# Patient Record
Sex: MALE | Race: BLACK OR AFRICAN AMERICAN | NOT HISPANIC OR LATINO | Employment: OTHER | URBAN - METROPOLITAN AREA
[De-identification: names, ages, dates, MRNs, and addresses within clinical notes are randomized per-mention and may not be internally consistent; named-entity substitution may affect disease eponyms.]

---

## 2017-10-14 ENCOUNTER — HOSPITAL ENCOUNTER (EMERGENCY)
Facility: HOSPITAL | Age: 37
Discharge: HOME | End: 2017-10-14
Attending: EMERGENCY MEDICINE

## 2017-10-14 VITALS
HEIGHT: 70 IN | WEIGHT: 200 LBS | OXYGEN SATURATION: 95 % | RESPIRATION RATE: 16 BRPM | TEMPERATURE: 97 F | BODY MASS INDEX: 28.63 KG/M2 | DIASTOLIC BLOOD PRESSURE: 97 MMHG | SYSTOLIC BLOOD PRESSURE: 155 MMHG | HEART RATE: 51 BPM

## 2017-10-14 DIAGNOSIS — N20.0 NEPHROLITHIASIS: Primary | ICD-10-CM

## 2017-10-14 DIAGNOSIS — R10.9 LEFT FLANK PAIN: ICD-10-CM

## 2017-10-14 DIAGNOSIS — R11.0 NAUSEA: ICD-10-CM

## 2017-10-14 LAB
ALBUMIN SERPL BCP-MCNC: 4.1 G/DL
ALP SERPL-CCNC: 80 U/L
ALT SERPL W/O P-5'-P-CCNC: 33 U/L
ANION GAP SERPL CALC-SCNC: 10 MMOL/L
AST SERPL-CCNC: 23 U/L
BACTERIA #/AREA URNS HPF: ABNORMAL /HPF
BASOPHILS # BLD AUTO: 0.04 K/UL
BASOPHILS NFR BLD: 0.3 %
BILIRUB SERPL-MCNC: 1.1 MG/DL
BILIRUB UR QL STRIP: NEGATIVE
BUN SERPL-MCNC: 9 MG/DL
CALCIUM SERPL-MCNC: 9.5 MG/DL
CHLORIDE SERPL-SCNC: 105 MMOL/L
CLARITY UR: CLEAR
CO2 SERPL-SCNC: 25 MMOL/L
COLOR UR: YELLOW
CREAT SERPL-MCNC: 1.3 MG/DL
DIFFERENTIAL METHOD: ABNORMAL
EOSINOPHIL # BLD AUTO: 0.2 K/UL
EOSINOPHIL NFR BLD: 1.2 %
ERYTHROCYTE [DISTWIDTH] IN BLOOD BY AUTOMATED COUNT: 12.3 %
EST. GFR  (AFRICAN AMERICAN): >60 ML/MIN/1.73 M^2
EST. GFR  (NON AFRICAN AMERICAN): >60 ML/MIN/1.73 M^2
GLUCOSE SERPL-MCNC: 106 MG/DL
GLUCOSE UR QL STRIP: NEGATIVE
HCT VFR BLD AUTO: 41.3 %
HGB BLD-MCNC: 14.9 G/DL
HGB UR QL STRIP: ABNORMAL
HYALINE CASTS #/AREA URNS LPF: 0 /LPF
KETONES UR QL STRIP: NEGATIVE
LEUKOCYTE ESTERASE UR QL STRIP: NEGATIVE
LYMPHOCYTES # BLD AUTO: 3.6 K/UL
LYMPHOCYTES NFR BLD: 28.7 %
MCH RBC QN AUTO: 30.7 PG
MCHC RBC AUTO-ENTMCNC: 36.1 G/DL
MCV RBC AUTO: 85 FL
MICROSCOPIC COMMENT: ABNORMAL
MONOCYTES # BLD AUTO: 1.4 K/UL
MONOCYTES NFR BLD: 11.5 %
NEUTROPHILS # BLD AUTO: 7.3 K/UL
NEUTROPHILS NFR BLD: 58.3 %
NITRITE UR QL STRIP: NEGATIVE
PH UR STRIP: >8 [PH] (ref 5–8)
PLATELET # BLD AUTO: 314 K/UL
PMV BLD AUTO: 8.2 FL
POTASSIUM SERPL-SCNC: 3.2 MMOL/L
PROT SERPL-MCNC: 8 G/DL
PROT UR QL STRIP: ABNORMAL
RBC # BLD AUTO: 4.86 M/UL
RBC #/AREA URNS HPF: 45 /HPF (ref 0–4)
SODIUM SERPL-SCNC: 140 MMOL/L
SP GR UR STRIP: 1.02 (ref 1–1.03)
URN SPEC COLLECT METH UR: ABNORMAL
UROBILINOGEN UR STRIP-ACNC: ABNORMAL EU/DL
WBC # BLD AUTO: 12.48 K/UL
WBC #/AREA URNS HPF: 1 /HPF (ref 0–5)

## 2017-10-14 PROCEDURE — 99284 EMERGENCY DEPT VISIT MOD MDM: CPT | Mod: 25

## 2017-10-14 PROCEDURE — 25000003 PHARM REV CODE 250: Performed by: PHYSICIAN ASSISTANT

## 2017-10-14 PROCEDURE — 96361 HYDRATE IV INFUSION ADD-ON: CPT

## 2017-10-14 PROCEDURE — 80053 COMPREHEN METABOLIC PANEL: CPT

## 2017-10-14 PROCEDURE — 85025 COMPLETE CBC W/AUTO DIFF WBC: CPT

## 2017-10-14 PROCEDURE — 81000 URINALYSIS NONAUTO W/SCOPE: CPT

## 2017-10-14 PROCEDURE — 96375 TX/PRO/DX INJ NEW DRUG ADDON: CPT

## 2017-10-14 PROCEDURE — 63600175 PHARM REV CODE 636 W HCPCS: Performed by: PHYSICIAN ASSISTANT

## 2017-10-14 PROCEDURE — 96374 THER/PROPH/DIAG INJ IV PUSH: CPT

## 2017-10-14 PROCEDURE — 96376 TX/PRO/DX INJ SAME DRUG ADON: CPT

## 2017-10-14 RX ORDER — HYDROMORPHONE HYDROCHLORIDE 2 MG/ML
0.5 INJECTION, SOLUTION INTRAMUSCULAR; INTRAVENOUS; SUBCUTANEOUS
Status: COMPLETED | OUTPATIENT
Start: 2017-10-14 | End: 2017-10-14

## 2017-10-14 RX ORDER — TAMSULOSIN HYDROCHLORIDE 0.4 MG/1
0.4 CAPSULE ORAL DAILY
Qty: 14 CAPSULE | Refills: 0 | Status: SHIPPED | OUTPATIENT
Start: 2017-10-14 | End: 2017-10-28

## 2017-10-14 RX ORDER — ONDANSETRON 4 MG/1
4 TABLET, ORALLY DISINTEGRATING ORAL EVERY 6 HOURS PRN
Qty: 20 TABLET | Refills: 0 | Status: SHIPPED | OUTPATIENT
Start: 2017-10-14 | End: 2017-10-19

## 2017-10-14 RX ORDER — OXYCODONE AND ACETAMINOPHEN 10; 325 MG/1; MG/1
1 TABLET ORAL EVERY 6 HOURS PRN
Qty: 15 TABLET | Refills: 0 | Status: SHIPPED | OUTPATIENT
Start: 2017-10-14 | End: 2017-10-19

## 2017-10-14 RX ORDER — ONDANSETRON 2 MG/ML
4 INJECTION INTRAMUSCULAR; INTRAVENOUS
Status: COMPLETED | OUTPATIENT
Start: 2017-10-14 | End: 2017-10-14

## 2017-10-14 RX ORDER — MORPHINE SULFATE 10 MG/ML
4 INJECTION INTRAMUSCULAR; INTRAVENOUS; SUBCUTANEOUS
Status: COMPLETED | OUTPATIENT
Start: 2017-10-14 | End: 2017-10-14

## 2017-10-14 RX ORDER — SODIUM CHLORIDE 9 MG/ML
1000 INJECTION, SOLUTION INTRAVENOUS
Status: COMPLETED | OUTPATIENT
Start: 2017-10-14 | End: 2017-10-14

## 2017-10-14 RX ORDER — TAMSULOSIN HYDROCHLORIDE 0.4 MG/1
0.4 CAPSULE ORAL
Status: COMPLETED | OUTPATIENT
Start: 2017-10-14 | End: 2017-10-14

## 2017-10-14 RX ADMIN — SODIUM CHLORIDE 1000 ML: 0.9 INJECTION, SOLUTION INTRAVENOUS at 10:10

## 2017-10-14 RX ADMIN — TAMSULOSIN HYDROCHLORIDE 0.4 MG: 0.4 CAPSULE ORAL at 12:10

## 2017-10-14 RX ADMIN — MORPHINE SULFATE 4 MG: 10 INJECTION INTRAVENOUS at 10:10

## 2017-10-14 RX ADMIN — HYDROMORPHONE HYDROCHLORIDE 0.5 MG: 2 INJECTION, SOLUTION INTRAMUSCULAR; INTRAVENOUS; SUBCUTANEOUS at 11:10

## 2017-10-14 RX ADMIN — HYDROMORPHONE HYDROCHLORIDE 0.5 MG: 2 INJECTION, SOLUTION INTRAMUSCULAR; INTRAVENOUS; SUBCUTANEOUS at 12:10

## 2017-10-14 RX ADMIN — ONDANSETRON 4 MG: 2 INJECTION INTRAMUSCULAR; INTRAVENOUS at 10:10

## 2017-10-14 NOTE — ED PROVIDER NOTES
Encounter Date: 10/14/2017    SCRIBE #1 NOTE: I, Rhys Evans, am scribing for, and in the presence of,  Shy Cross PA-C. I have scribed the following portions of the note - Other sections scribed: HPI, ROS.       History     Chief Complaint   Patient presents with    Flank Pain     States he has left side flank pain that started 30 min ago.  No hx of kidney stones     CC: Flank Pain    37 y/o male with no PMHx presents to the ED c/o acute onset L sided flank pain that radiates to LLQ abdomen with associated decreased urine output that started 30 min PTA. The pain is severe (10/10) and constant; lying down exacerbates the pain. 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 The patient reports FMHx of kidney stones. The patient denies hx of abdominal surgeries or similar symptoms in the past. The patient denies dysuria, difficulty urinating, chest pain, difficulty breathing, urinary frequency, urinary urgency, testicular pain, testicular swelling, fever, constipation, N/V/D, or cough. No attempted treatment reported. No alleviating factors or other symptoms reported.      The history is provided by the patient. No  was used.     Review of patient's allergies indicates:  No Known Allergies  History reviewed. No pertinent past medical history.  History reviewed. No pertinent surgical history.  Family History   Problem Relation Age of Onset    Diabetes Mother     Hypertension Mother      Social History   Substance Use Topics    Smoking status: Current Every Day Smoker     Packs/day: 0.50    Smokeless tobacco: Never Used    Alcohol use Yes     Review of Systems   Constitutional: Negative for chills and fever.   HENT: Negative for rhinorrhea.    Eyes: Negative for redness.   Respiratory: Negative for cough and shortness of breath.         (-) difficulty breathing   Cardiovascular: Negative for chest pain.   Gastrointestinal: Positive for  abdominal pain (LLQ). Negative for constipation, diarrhea, nausea and vomiting.   Genitourinary: Positive for difficulty urinating and flank pain (L sided). Negative for decreased urine volume, discharge, dysuria, frequency, hematuria, penile pain, penile swelling, scrotal swelling, testicular pain and urgency.   Musculoskeletal: Negative for back pain.        (-) arm or leg trouble   Skin: Negative for rash.   Neurological: Negative for headaches.       Physical Exam     Initial Vitals [10/14/17 1015]   BP Pulse Resp Temp SpO2   (!) 142/97 69 18 98.4 °F (36.9 °C) 99 %      MAP       112         Physical Exam    Nursing note and vitals reviewed.  Constitutional: He appears well-developed and well-nourished. He appears distressed.   HENT:   Head: Normocephalic.   Eyes: Conjunctivae are normal.   Neck: Normal range of motion.   Cardiovascular: Normal rate and regular rhythm. Exam reveals no gallop and no friction rub.    No murmur heard.  Pulmonary/Chest: Breath sounds normal. He has no wheezes. He has no rhonchi. He has no rales.   Abdominal: Soft. Bowel sounds are normal. He exhibits no distension. There is no tenderness. There is no rebound and no guarding.   No flank TTP    Musculoskeletal: Normal range of motion.   No midline or paraspinal TTP    Neurological: He is alert.   Skin: Skin is warm and dry.   Psychiatric: He has a normal mood and affect.         ED Course   Procedures  Labs Reviewed   CBC W/ AUTO DIFFERENTIAL - Abnormal; Notable for the following:        Result Value    MCHC 36.1 (*)     MPV 8.2 (*)     Mono # 1.4 (*)     All other components within normal limits   COMPREHENSIVE METABOLIC PANEL - Abnormal; Notable for the following:     Potassium 3.2 (*)     Total Bilirubin 1.1 (*)     All other components within normal limits   URINALYSIS - Abnormal; Notable for the following:     pH, UA >8.0 (*)     Protein, UA 2+ (*)     Occult Blood UA 1+ (*)     Urobilinogen, UA 2.0-3.0 (*)     All other  components within normal limits   URINALYSIS MICROSCOPIC - Abnormal; Notable for the following:     RBC, UA 45 (*)     All other components within normal limits             Medical Decision Making:   Initial Assessment:   Patient is a 36-year-old male who presents for evaluation of 30 minute history of constant left flank pain with urge to urinate but decreased urine stream. Patient does not personal history of kidney stones, but does have a history of kidney stones.  She denies any recent heavy lifting or trauma.  Patient is afebrile, appears uncomfortable, diaphoretic and hunched over due to pain.  Physical exam findings as detailed above.  Patient given IV fluids, morphine, Zofran for symptomatic treatment while labs and imaging are pending. At 1110, pt reports mild improvement of pain to 8/10 from 10/10. Dilaudid given. Pain improved to 5/10 at 1127.  UA with 45 RBC.  CBC without leukocytosis.  CMP with mild hypokalemia.  BUN/creatinine within normal limits.  CT abdomen and pelvis remarkable for mild left-sided hydronephrosis and hydroureter with 2 mm stone in distal left ureter.  Patient given Flomax in the ED. Pt reports significant improvement of symptoms prior to discharge.   Discharged home with Flomax and Percocet and Zofran for symptomatic treatment.  Urology follow-up in 2 days.  ER return precautions discussed, worsening symptoms, inability to urinate or as needed.  I discussed this patient with Dr. Herrera who agrees with the assessment and plan.            Scribe Attestation:   Scribe #1: I performed the above scribed service and the documentation accurately describes the services I performed. I attest to the accuracy of the note.    Attending Attestation:     Physician Attestation Statement for NP/PA:   I discussed this assessment and plan of this patient with the NP/PA, but I did not personally examine the patient. The face to face encounter was performed by the NP/PA.        Physician Attestation  for Scribe:  Physician Attestation Statement for Scribe #1: I, Shy Cross PA-C, reviewed documentation, as scribed by Rhys Evans in my presence, and it is both accurate and complete.                 ED Course      Clinical Impression:   The primary encounter diagnosis was Nephrolithiasis. Diagnoses of Left flank pain and Nausea were also pertinent to this visit.                           Shy Ellsworth PA-C  10/14/17 3081       Nima Herrera MD  10/15/17 2841

## 2017-10-14 NOTE — DISCHARGE INSTRUCTIONS
Drink plenty of fluids. Take Flomax as prescribed to help you pass the stone. Take Percocet as prescribed for pain and Zofran as prescribed for nausea.     Follow up with primary care and Urology in 2 days. Return to ER if you develop inability to urinate, worsening symptoms or as needed.

## 2022-12-02 ENCOUNTER — HOSPITAL ENCOUNTER (EMERGENCY)
Facility: HOSPITAL | Age: 42
Discharge: LEFT BEFORE DISCHARGE | End: 2022-12-02

## 2022-12-02 VITALS
HEIGHT: 71 IN | OXYGEN SATURATION: 100 % | RESPIRATION RATE: 18 BRPM | BODY MASS INDEX: 28 KG/M2 | SYSTOLIC BLOOD PRESSURE: 142 MMHG | HEART RATE: 70 BPM | TEMPERATURE: 98 F | WEIGHT: 200 LBS | DIASTOLIC BLOOD PRESSURE: 100 MMHG

## 2022-12-02 LAB
AMORPH CRY URNS QL MICRO: ABNORMAL
BACTERIA #/AREA URNS HPF: ABNORMAL /HPF
BILIRUB UR QL STRIP: NEGATIVE
CLARITY UR: ABNORMAL
COLOR UR: YELLOW
GLUCOSE UR QL STRIP: NEGATIVE
HGB UR QL STRIP: NEGATIVE
HYALINE CASTS #/AREA URNS LPF: 0 /LPF
KETONES UR QL STRIP: ABNORMAL
LEUKOCYTE ESTERASE UR QL STRIP: ABNORMAL
MICROSCOPIC COMMENT: ABNORMAL
NITRITE UR QL STRIP: NEGATIVE
PH UR STRIP: >8 [PH] (ref 5–8)
PROT UR QL STRIP: ABNORMAL
RBC #/AREA URNS HPF: 12 /HPF (ref 0–4)
SP GR UR STRIP: >1.03 (ref 1–1.03)
TRI-PHOS CRY URNS QL MICRO: ABNORMAL
URN SPEC COLLECT METH UR: ABNORMAL
UROBILINOGEN UR STRIP-ACNC: ABNORMAL EU/DL
WBC #/AREA URNS HPF: 5 /HPF (ref 0–5)

## 2022-12-02 PROCEDURE — 81000 URINALYSIS NONAUTO W/SCOPE: CPT | Performed by: INTERNAL MEDICINE

## 2022-12-02 PROCEDURE — 99900041 HC LEFT WITHOUT BEING SEEN- EMERGENCY

## 2022-12-02 RX ORDER — KETOROLAC TROMETHAMINE 30 MG/ML
10 INJECTION, SOLUTION INTRAMUSCULAR; INTRAVENOUS
Status: DISCONTINUED | OUTPATIENT
Start: 2022-12-02 | End: 2022-12-03 | Stop reason: HOSPADM

## 2024-12-01 ENCOUNTER — HOSPITAL ENCOUNTER (EMERGENCY)
Facility: HOSPITAL | Age: 44
Discharge: HOME | End: 2024-12-01
Attending: INTERNAL MEDICINE

## 2024-12-01 VITALS
RESPIRATION RATE: 18 BRPM | HEART RATE: 88 BPM | DIASTOLIC BLOOD PRESSURE: 83 MMHG | TEMPERATURE: 99 F | BODY MASS INDEX: 26.48 KG/M2 | OXYGEN SATURATION: 98 % | SYSTOLIC BLOOD PRESSURE: 118 MMHG | HEIGHT: 70 IN | WEIGHT: 185 LBS

## 2024-12-01 DIAGNOSIS — J06.9 VIRAL URI WITH COUGH: Primary | ICD-10-CM

## 2024-12-01 LAB
CTP QC/QA: YES
INFLUENZA A ANTIGEN, POC: NEGATIVE
INFLUENZA B ANTIGEN, POC: NEGATIVE
SARS-COV-2 RDRP RESP QL NAA+PROBE: NEGATIVE

## 2024-12-01 PROCEDURE — 99284 EMERGENCY DEPT VISIT MOD MDM: CPT | Mod: 25,ER

## 2024-12-01 PROCEDURE — 87635 SARS-COV-2 COVID-19 AMP PRB: CPT | Mod: ER | Performed by: PHYSICIAN ASSISTANT

## 2024-12-01 PROCEDURE — 25000003 PHARM REV CODE 250: Mod: ER | Performed by: PHYSICIAN ASSISTANT

## 2024-12-01 PROCEDURE — 87804 INFLUENZA ASSAY W/OPTIC: CPT | Mod: ER

## 2024-12-01 PROCEDURE — 96372 THER/PROPH/DIAG INJ SC/IM: CPT | Performed by: PHYSICIAN ASSISTANT

## 2024-12-01 PROCEDURE — 63600175 PHARM REV CODE 636 W HCPCS: Mod: ER | Performed by: PHYSICIAN ASSISTANT

## 2024-12-01 RX ORDER — ACETAMINOPHEN 500 MG
500 TABLET ORAL EVERY 4 HOURS PRN
Qty: 20 TABLET | Refills: 0 | Status: SHIPPED | OUTPATIENT
Start: 2024-12-01 | End: 2024-12-06

## 2024-12-01 RX ORDER — CETIRIZINE HYDROCHLORIDE 10 MG/1
10 TABLET ORAL DAILY
Qty: 14 TABLET | Refills: 0 | Status: SHIPPED | OUTPATIENT
Start: 2024-12-01 | End: 2024-12-15

## 2024-12-01 RX ORDER — FLUTICASONE PROPIONATE 50 MCG
1 SPRAY, SUSPENSION (ML) NASAL 2 TIMES DAILY PRN
Qty: 15 G | Refills: 0 | Status: SHIPPED | OUTPATIENT
Start: 2024-12-01 | End: 2024-12-31

## 2024-12-01 RX ORDER — BENZONATATE 100 MG/1
100 CAPSULE ORAL
Status: COMPLETED | OUTPATIENT
Start: 2024-12-01 | End: 2024-12-01

## 2024-12-01 RX ORDER — BENZONATATE 100 MG/1
100 CAPSULE ORAL 3 TIMES DAILY PRN
Qty: 20 CAPSULE | Refills: 0 | Status: SHIPPED | OUTPATIENT
Start: 2024-12-01 | End: 2024-12-08

## 2024-12-01 RX ORDER — IBUPROFEN 600 MG/1
600 TABLET ORAL EVERY 6 HOURS PRN
Qty: 20 TABLET | Refills: 0 | Status: SHIPPED | OUTPATIENT
Start: 2024-12-01 | End: 2024-12-06

## 2024-12-01 RX ORDER — ACETAMINOPHEN 500 MG
500 TABLET ORAL
Status: COMPLETED | OUTPATIENT
Start: 2024-12-01 | End: 2024-12-01

## 2024-12-01 RX ORDER — GUAIFENESIN 100 MG/5ML
400 SOLUTION ORAL EVERY 4 HOURS PRN
Qty: 180 ML | Refills: 0 | Status: SHIPPED | OUTPATIENT
Start: 2024-12-01 | End: 2024-12-11

## 2024-12-01 RX ORDER — KETOROLAC TROMETHAMINE 30 MG/ML
30 INJECTION, SOLUTION INTRAMUSCULAR; INTRAVENOUS
Status: COMPLETED | OUTPATIENT
Start: 2024-12-01 | End: 2024-12-01

## 2024-12-01 RX ADMIN — KETOROLAC TROMETHAMINE 30 MG: 30 INJECTION, SOLUTION INTRAMUSCULAR; INTRAVENOUS at 08:12

## 2024-12-01 RX ADMIN — BENZONATATE 100 MG: 100 CAPSULE ORAL at 07:12

## 2024-12-01 RX ADMIN — ACETAMINOPHEN 500 MG: 500 TABLET ORAL at 07:12

## 2024-12-01 NOTE — Clinical Note
"Serge Parra" Renzo was seen and treated in our emergency department on 12/1/2024.  He may return to work on 12/02/2024.       If you have any questions or concerns, please don't hesitate to call.      CHIRAG Henley RN RN    "

## 2024-12-01 NOTE — Clinical Note
"Serge"Neelima Muller was seen and treated in our emergency department on 12/1/2024.     COVID-19 is present in our communities across the state. There is limited testing for COVID at this time, so not all patients can be tested. In this situation, your employee meets the following criteria:    Serge Muller Jr. has met the criteria for COVID-19 testing and has a NEGATIVE result. The employee can return to work once they are asymptomatic for 24 hours without the use of fever reducing medications (Tylenol, Motrin, etc).     If the employee is not fully vaccinated and had a close contact:  · Retest at 5 to 7 days post-exposure  · If possible, it is recommended that they quarantine for 5 days from the time of contact regardless of their test status.  · A mask should be worn post quarantine for 5 days.    If you have any questions or concerns, or if I can be of further assistance, please do not hesitate to contact me.    Sincerely,             Shy Ellsworth PA-C"

## 2024-12-02 NOTE — ED PROVIDER NOTES
Encounter Date: 12/1/2024    SCRIBE #1 NOTE: I, Julian Dewitt, am scribing for, and in the presence of,  Shy Ellsworth PA-C. I have scribed the following portions of the note - Other sections scribed: HPI,ROS,PE.       History     Chief Complaint   Patient presents with    General Illness     Cough, sweating and body aches since Friday. OTC meds not helping.     CC: URI symptoms    HPI: Serge Muller Jr., a 43 y.o. male with no pertinent PMHx, presents to the ED with URI symptoms onset 2 days ago. Patient reports symptoms of dry cough, chest pain secondary to cough, diaphoresis, subjective fever, headache, myalgias, decrease in appetite and neck pain. Patient reports pain to the back of his neck, he reports that the pain is worse with turning his head to the left. Patient endorses taking Dayquil and TheraFlu with no alleviation of symptoms. Denies SOB, nausea, vomiting, diarrhea or ear pain.     The history is provided by the patient. No  was used.     Review of patient's allergies indicates:  No Known Allergies  History reviewed. No pertinent past medical history.  History reviewed. No pertinent surgical history.  Family History   Problem Relation Name Age of Onset    Diabetes Mother      Hypertension Mother       Social History     Tobacco Use    Smoking status: Every Day     Current packs/day: 0.50     Types: Cigarettes    Smokeless tobacco: Never   Substance Use Topics    Alcohol use: Yes    Drug use: Yes     Types: Marijuana     Review of Systems   Constitutional:  Positive for appetite change (decrease), diaphoresis and fever (subjective).   HENT:  Negative for ear pain and sore throat.    Eyes:  Negative for visual disturbance.   Respiratory:  Positive for cough. Negative for shortness of breath.    Cardiovascular:  Positive for chest pain.   Gastrointestinal:  Negative for diarrhea, nausea and vomiting.   Genitourinary:  Negative for dysuria.   Musculoskeletal:  Positive for  myalgias and neck pain.   Skin:  Negative for rash.   Neurological:  Positive for headaches. Negative for syncope.       Physical Exam     Initial Vitals [12/01/24 1902]   BP Pulse Resp Temp SpO2   118/81 87 16 98.5 °F (36.9 °C) 98 %      MAP       --         Physical Exam    Nursing note and vitals reviewed.  Constitutional: He appears well-developed and well-nourished. No distress.   HENT:   Head: Normocephalic.   Right Ear: Hearing, tympanic membrane, external ear and ear canal normal.   Left Ear: Hearing, tympanic membrane, external ear and ear canal normal.   Nose: Mucosal edema present. Right sinus exhibits no maxillary sinus tenderness and no frontal sinus tenderness. Left sinus exhibits no maxillary sinus tenderness and no frontal sinus tenderness. Mouth/Throat: Uvula is midline and mucous membranes are normal. Posterior oropharyngeal erythema present. No oropharyngeal exudate or posterior oropharyngeal edema.   Post nasal drip.   Eyes: Conjunctivae are normal.   Neck: Neck supple.   R cervical paraspinal pain with flexion and extension of C spine and rotation to the L    Normal range of motion.  Cardiovascular:  Normal rate and regular rhythm.     Exam reveals no gallop and no friction rub.       No murmur heard.  Pulmonary/Chest: Breath sounds normal. No respiratory distress. He has no wheezes. He has no rhonchi. He has no rales.   Abdominal: Abdomen is soft. Bowel sounds are normal. He exhibits no distension. There is no abdominal tenderness. There is no rebound and no guarding.   Musculoskeletal:      Cervical back: Normal range of motion and neck supple. Normal range of motion.     Lymphadenopathy:     He has no cervical adenopathy.   Neurological: He is alert.   Skin: Skin is warm and dry. No rash noted.   Psychiatric: He has a normal mood and affect.         ED Course   Procedures  Labs Reviewed   SARS-COV-2 RDRP GENE       Result Value    POC Rapid COVID Negative       Acceptable Yes       Narrative:     This test utilizes isothermal nucleic acid amplification technology to detect the SARS-CoV-2 RdRp nucleic acid segment. The analytical sensitivity (limit of detection) is 500 copies/swab.     A POSITIVE result is indicative of the presence of SARS-CoV-2 RNA; clinical correlation with patient history and other diagnostic information is necessary to determine patient infection status.    A NEGATIVE result means that SARS-CoV-2 nucleic acids are not present above the limit of detection. A NEGATIVE result should be treated as presumptive. It does not rule out the possibility of COVID-19 and should not be the sole basis for treatment decisions. If COVID-19 is strongly suspected based on clinical and exposure history, re-testing using an alternate molecular assay should be considered.     Commercial kits are provided by [employer].        POCT INFLUENZA A/B MOLECULAR   POCT RAPID INFLUENZA A/B    Influenza B Ag negative      Inflenza A Ag negative            Imaging Results    None          Medications   benzonatate capsule 100 mg (100 mg Oral Given 12/1/24 1925)   acetaminophen tablet 500 mg (500 mg Oral Given 12/1/24 1925)   ketorolac injection 30 mg (30 mg Intramuscular Given 12/1/24 2030)     Medical Decision Making  [age] male with no pertinent history presenting for evaluation of URI symptoms for the past 2 days.  Patient was afebrile in no distress.  Exam above.  COVID and flu negative patient was considered doubt pneumonia.  No evidence of bacterial etiology of his symptoms at this time.  It was complaining of right-sided neck pain and states he feels that he may have slept wrong due to sleeping from feeling ill.  No meningeal signs.  No evidence of bacterial etiology.  Will have patient follow up with primary care.  Toradol IM, Tylenol p.o. and Tessalon p.o. given in the ED. will have patient return ER for worsening or as needed.    Amount and/or Complexity of Data  Reviewed  Labs: ordered. Decision-making details documented in ED Course.    Risk  OTC drugs.  Prescription drug management.            Scribe Attestation:   Scribe #1: I performed the above scribed service and the documentation accurately describes the services I performed. I attest to the accuracy of the note.              I, Shy Ellsworth PA-C , personally performed the services described in this documentation. All medical record entries made by the scribe were at my direction and in my presence. I have reviewed the chart and agree that the record reflects my personal performance and is accurate and complete.      DISCLAIMER: This note was prepared with [employer] voice recognition transcription software. Garbled syntax, mangled pronouns, and other bizarre constructions may be attributed to that software system.                  Clinical Impression:  Final diagnoses:  [J06.9] Viral URI with cough (Primary)                 Shy Ellsworth PA-C  12/01/24 2036

## 2024-12-02 NOTE — DISCHARGE INSTRUCTIONS

## 2025-04-29 ENCOUNTER — HOSPITAL ENCOUNTER (EMERGENCY)
Facility: HOSPITAL | Age: 45
Discharge: HOME | End: 2025-04-29
Attending: EMERGENCY MEDICINE

## 2025-04-29 VITALS
DIASTOLIC BLOOD PRESSURE: 86 MMHG | TEMPERATURE: 98 F | OXYGEN SATURATION: 98 % | SYSTOLIC BLOOD PRESSURE: 131 MMHG | WEIGHT: 185 LBS | HEART RATE: 89 BPM | BODY MASS INDEX: 26.48 KG/M2 | RESPIRATION RATE: 20 BRPM | HEIGHT: 70 IN

## 2025-04-29 DIAGNOSIS — M79.673 FOOT PAIN: ICD-10-CM

## 2025-04-29 DIAGNOSIS — M25.569 KNEE PAIN: ICD-10-CM

## 2025-04-29 DIAGNOSIS — M25.579 ANKLE PAIN: ICD-10-CM

## 2025-04-29 PROCEDURE — 63600175 PHARM REV CODE 636 W HCPCS: Mod: JZ,TB

## 2025-04-29 PROCEDURE — 96372 THER/PROPH/DIAG INJ SC/IM: CPT

## 2025-04-29 PROCEDURE — 99284 EMERGENCY DEPT VISIT MOD MDM: CPT | Mod: 25

## 2025-04-29 RX ORDER — KETOROLAC TROMETHAMINE 30 MG/ML
30 INJECTION, SOLUTION INTRAMUSCULAR; INTRAVENOUS
Status: COMPLETED | OUTPATIENT
Start: 2025-04-29 | End: 2025-04-29

## 2025-04-29 RX ORDER — ACETAMINOPHEN 500 MG
1000 TABLET ORAL EVERY 6 HOURS PRN
Qty: 30 TABLET | Refills: 0 | Status: SHIPPED | OUTPATIENT
Start: 2025-04-29

## 2025-04-29 RX ORDER — KETOROLAC TROMETHAMINE 10 MG/1
10 TABLET, FILM COATED ORAL EVERY 6 HOURS
Qty: 20 TABLET | Refills: 0 | Status: SHIPPED | OUTPATIENT
Start: 2025-04-29 | End: 2025-05-04

## 2025-04-29 RX ADMIN — KETOROLAC TROMETHAMINE 30 MG: 30 INJECTION, SOLUTION INTRAMUSCULAR; INTRAVENOUS at 02:04

## 2025-04-29 NOTE — DISCHARGE INSTRUCTIONS
Take Toradol or Tylenol as directed for pain.  Rest, ice, compress, and elevate extremity for alleviation of pain and swelling.    Thank you for coming to our Emergency Department today. It is important to remember that some problems or medical conditions are difficult to diagnose and may not be found or addressed during your Emergency Department visit.  These conditions often start with non-specific symptoms and can only be diagnosed on follow up visits with your primary care physician or specialist when the symptoms continue or change. Please remember that all medical conditions can change, and we cannot predict how you will be feeling tomorrow or the next day. Return to the ER with any questions/concerns, new/concerning symptoms, worsening or failure to improve.       Be sure to follow up with your primary care doctor and review all labs/imaging/tests that were performed during your ER visit with them. It is very common for us to identify non-emergent incidental findings which must be followed up with your primary care physician.  Some labs/imaging/tests may be outside of the normal range, and require non-emergent follow-up and/or further investigation/treatment/procedures/testing to help diagnose/exclude/prevent complications or other potentially serious medical conditions. Some abnormalities may not have been discussed or addressed during your ER visit.     An ER visit does not replace a primary care visit, and many screening tests or follow-up tests cannot be ordered by an ER doctor or performed by the ER. Some tests may even require pre-approval.    If you do not have a primary care doctor, you may contact the one listed on your discharge paperwork or you may also call the [facility] Appointment Desk at [phone] , or [address] [place] [address] at  [phone] to schedule an appointment, or establish care with a primary care doctor or even a specialist and to obtain information about local resources. It is  important to your health that you have a primary care doctor.    Please take all medications as directed. We have done our best to select a medication for you that will treat your condition however, all medications may potentially have side-effects and it is impossible to predict which medications may give you side-effects or what those side-effects (if any) those medications may give you.  If you feel that you are having a negative effect or side-effect of any medication you should stop taking those medications immediately and seek medical attention. If you feel that you are having a life-threatening reaction call 911.        Do not drive, swim, climb to height, take a bath, operate heavy machinery, drink alcohol or take potentially sedating medications, sign any legal documents or make any important decisions for 24 hours if you have received any pain medications, sedatives or mood altering drugs during your ER visit or within 24 hours of taking them if they have been prescribed to you.     You can find additional resources for Dentists, hearing aids, durable medical equipment, low cost pharmacies and other resources at [email or web address]
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
